# Patient Record
Sex: MALE | Race: WHITE | Employment: OTHER | URBAN - METROPOLITAN AREA
[De-identification: names, ages, dates, MRNs, and addresses within clinical notes are randomized per-mention and may not be internally consistent; named-entity substitution may affect disease eponyms.]

---

## 2018-04-09 ENCOUNTER — APPOINTMENT (OUTPATIENT)
Dept: CT IMAGING | Facility: HOSPITAL | Age: 58
DRG: 186 | End: 2018-04-09
Attending: EMERGENCY MEDICINE
Payer: MEDICARE

## 2018-04-09 ENCOUNTER — APPOINTMENT (OUTPATIENT)
Dept: CV DIAGNOSTICS | Facility: HOSPITAL | Age: 58
DRG: 186 | End: 2018-04-09
Attending: INTERNAL MEDICINE
Payer: MEDICARE

## 2018-04-09 ENCOUNTER — HOSPITAL ENCOUNTER (INPATIENT)
Facility: HOSPITAL | Age: 58
LOS: 5 days | Discharge: HOME HEALTH | DRG: 186 | End: 2018-04-14
Attending: EMERGENCY MEDICINE | Admitting: HOSPITALIST
Payer: MEDICARE

## 2018-04-09 ENCOUNTER — APPOINTMENT (OUTPATIENT)
Dept: GENERAL RADIOLOGY | Facility: HOSPITAL | Age: 58
DRG: 186 | End: 2018-04-09
Attending: EMERGENCY MEDICINE
Payer: MEDICARE

## 2018-04-09 DIAGNOSIS — K92.2 GASTROINTESTINAL HEMORRHAGE, UNSPECIFIED GASTROINTESTINAL HEMORRHAGE TYPE: Primary | ICD-10-CM

## 2018-04-09 DIAGNOSIS — K62.5 RECTAL BLEEDING: ICD-10-CM

## 2018-04-09 DIAGNOSIS — I31.3 PERICARDIAL EFFUSION: ICD-10-CM

## 2018-04-09 DIAGNOSIS — L03.116 CELLULITIS OF LEFT LOWER EXTREMITY: ICD-10-CM

## 2018-04-09 DIAGNOSIS — J90 PLEURAL EFFUSION: ICD-10-CM

## 2018-04-09 PROCEDURE — 99223 1ST HOSP IP/OBS HIGH 75: CPT | Performed by: INTERNAL MEDICINE

## 2018-04-09 PROCEDURE — 71260 CT THORAX DX C+: CPT | Performed by: EMERGENCY MEDICINE

## 2018-04-09 PROCEDURE — 93306 TTE W/DOPPLER COMPLETE: CPT | Performed by: INTERNAL MEDICINE

## 2018-04-09 PROCEDURE — 71045 X-RAY EXAM CHEST 1 VIEW: CPT | Performed by: EMERGENCY MEDICINE

## 2018-04-09 PROCEDURE — 99223 1ST HOSP IP/OBS HIGH 75: CPT | Performed by: HOSPITALIST

## 2018-04-09 RX ORDER — TRAZODONE HYDROCHLORIDE 50 MG/1
50 TABLET ORAL NIGHTLY
COMMUNITY

## 2018-04-09 RX ORDER — TRAZODONE HYDROCHLORIDE 50 MG/1
50 TABLET ORAL NIGHTLY
Status: DISCONTINUED | OUTPATIENT
Start: 2018-04-09 | End: 2018-04-14

## 2018-04-09 RX ORDER — DOCUSATE SODIUM 100 MG/1
100 CAPSULE, LIQUID FILLED ORAL DAILY
Status: DISCONTINUED | OUTPATIENT
Start: 2018-04-09 | End: 2018-04-14

## 2018-04-09 RX ORDER — IPRATROPIUM BROMIDE AND ALBUTEROL SULFATE 2.5; .5 MG/3ML; MG/3ML
3 SOLUTION RESPIRATORY (INHALATION) 3 TIMES DAILY
Status: DISCONTINUED | OUTPATIENT
Start: 2018-04-09 | End: 2018-04-14

## 2018-04-09 RX ORDER — ALPRAZOLAM 1 MG/1
2 TABLET ORAL 3 TIMES DAILY
Status: DISCONTINUED | OUTPATIENT
Start: 2018-04-09 | End: 2018-04-10

## 2018-04-09 RX ORDER — OMEPRAZOLE 20 MG/1
20 CAPSULE, DELAYED RELEASE ORAL
COMMUNITY

## 2018-04-09 RX ORDER — HYDROCODONE BITARTRATE AND ACETAMINOPHEN 5; 325 MG/1; MG/1
1 TABLET ORAL ONCE
Status: DISCONTINUED | OUTPATIENT
Start: 2018-04-09 | End: 2018-04-14

## 2018-04-09 RX ORDER — HYDROCODONE BITARTRATE AND ACETAMINOPHEN 10; 325 MG/1; MG/1
1 TABLET ORAL EVERY 6 HOURS PRN
Status: DISCONTINUED | OUTPATIENT
Start: 2018-04-09 | End: 2018-04-14

## 2018-04-09 RX ORDER — ACETAMINOPHEN 325 MG/1
650 TABLET ORAL EVERY 4 HOURS PRN
Status: DISCONTINUED | OUTPATIENT
Start: 2018-04-09 | End: 2018-04-14

## 2018-04-09 RX ORDER — TIZANIDINE 4 MG/1
4 TABLET ORAL 3 TIMES DAILY
COMMUNITY

## 2018-04-09 RX ORDER — HYDROCODONE BITARTRATE AND ACETAMINOPHEN 5; 325 MG/1; MG/1
2 TABLET ORAL EVERY 4 HOURS PRN
Status: DISCONTINUED | OUTPATIENT
Start: 2018-04-09 | End: 2018-04-14

## 2018-04-09 RX ORDER — PREGABALIN 50 MG/1
50 CAPSULE ORAL 3 TIMES DAILY
COMMUNITY

## 2018-04-09 RX ORDER — AMIODARONE HYDROCHLORIDE 200 MG/1
200 TABLET ORAL 2 TIMES DAILY
Status: DISCONTINUED | OUTPATIENT
Start: 2018-04-09 | End: 2018-04-12

## 2018-04-09 RX ORDER — HYDROCODONE BITARTRATE AND ACETAMINOPHEN 5; 325 MG/1; MG/1
1 TABLET ORAL EVERY 4 HOURS PRN
Status: DISCONTINUED | OUTPATIENT
Start: 2018-04-09 | End: 2018-04-09

## 2018-04-09 RX ORDER — ASPIRIN 81 MG/1
81 TABLET, CHEWABLE ORAL DAILY
COMMUNITY

## 2018-04-09 RX ORDER — PANTOPRAZOLE SODIUM 20 MG/1
20 TABLET, DELAYED RELEASE ORAL
Status: DISCONTINUED | OUTPATIENT
Start: 2018-04-10 | End: 2018-04-14

## 2018-04-09 RX ORDER — METOPROLOL TARTRATE 50 MG/1
50 TABLET, FILM COATED ORAL 2 TIMES DAILY
COMMUNITY

## 2018-04-09 RX ORDER — ALPRAZOLAM 2 MG/1
2 TABLET ORAL 3 TIMES DAILY
Status: ON HOLD | COMMUNITY
End: 2018-04-14

## 2018-04-09 RX ORDER — ATORVASTATIN CALCIUM 40 MG/1
40 TABLET, FILM COATED ORAL NIGHTLY
Status: DISCONTINUED | OUTPATIENT
Start: 2018-04-09 | End: 2018-04-14

## 2018-04-09 RX ORDER — AMIODARONE HYDROCHLORIDE 200 MG/1
200 TABLET ORAL 2 TIMES DAILY
Status: ON HOLD | COMMUNITY
End: 2018-04-14

## 2018-04-09 RX ORDER — POLYETHYLENE GLYCOL 3350 17 G/17G
17 POWDER, FOR SOLUTION ORAL DAILY PRN
Status: DISCONTINUED | OUTPATIENT
Start: 2018-04-09 | End: 2018-04-14

## 2018-04-09 RX ORDER — DEXTROSE MONOHYDRATE 25 G/50ML
50 INJECTION, SOLUTION INTRAVENOUS AS NEEDED
Status: DISCONTINUED | OUTPATIENT
Start: 2018-04-09 | End: 2018-04-14

## 2018-04-09 RX ORDER — HYDROCODONE BITARTRATE AND ACETAMINOPHEN 10; 325 MG/1; MG/1
1 TABLET ORAL EVERY 6 HOURS PRN
COMMUNITY

## 2018-04-09 RX ORDER — SODIUM CHLORIDE 0.9 % (FLUSH) 0.9 %
3 SYRINGE (ML) INJECTION AS NEEDED
Status: DISCONTINUED | OUTPATIENT
Start: 2018-04-09 | End: 2018-04-14

## 2018-04-09 RX ORDER — METOPROLOL TARTRATE 50 MG/1
50 TABLET, FILM COATED ORAL 2 TIMES DAILY
Status: DISCONTINUED | OUTPATIENT
Start: 2018-04-09 | End: 2018-04-14

## 2018-04-09 RX ORDER — NYSTATIN 100000 U/G
CREAM TOPICAL 4 TIMES DAILY
COMMUNITY

## 2018-04-09 RX ORDER — TIZANIDINE 4 MG/1
4 TABLET ORAL 3 TIMES DAILY
Status: DISCONTINUED | OUTPATIENT
Start: 2018-04-09 | End: 2018-04-14

## 2018-04-09 RX ORDER — ALPRAZOLAM 0.5 MG/1
0.5 TABLET ORAL ONCE
Status: DISCONTINUED | OUTPATIENT
Start: 2018-04-09 | End: 2018-04-14

## 2018-04-09 RX ORDER — SODIUM PHOSPHATE, DIBASIC AND SODIUM PHOSPHATE, MONOBASIC 7; 19 G/133ML; G/133ML
1 ENEMA RECTAL ONCE AS NEEDED
Status: DISCONTINUED | OUTPATIENT
Start: 2018-04-09 | End: 2018-04-14

## 2018-04-09 RX ORDER — DOCUSATE SODIUM 100 MG/1
100 CAPSULE, LIQUID FILLED ORAL 2 TIMES DAILY
Status: DISCONTINUED | OUTPATIENT
Start: 2018-04-09 | End: 2018-04-09

## 2018-04-09 RX ORDER — PREGABALIN 50 MG/1
50 CAPSULE ORAL 3 TIMES DAILY
Status: DISCONTINUED | OUTPATIENT
Start: 2018-04-09 | End: 2018-04-14

## 2018-04-09 RX ORDER — ATORVASTATIN CALCIUM 40 MG/1
40 TABLET, FILM COATED ORAL NIGHTLY
COMMUNITY

## 2018-04-09 RX ORDER — ACETAMINOPHEN 325 MG/1
650 TABLET ORAL EVERY 6 HOURS PRN
Status: DISCONTINUED | OUTPATIENT
Start: 2018-04-09 | End: 2018-04-14

## 2018-04-09 RX ORDER — CEPHALEXIN 500 MG/1
500 CAPSULE ORAL 2 TIMES DAILY
Status: ON HOLD | COMMUNITY
End: 2018-04-14

## 2018-04-09 RX ORDER — BISACODYL 10 MG
10 SUPPOSITORY, RECTAL RECTAL
Status: DISCONTINUED | OUTPATIENT
Start: 2018-04-09 | End: 2018-04-14

## 2018-04-09 RX ORDER — DOCUSATE SODIUM 100 MG/1
100 CAPSULE, LIQUID FILLED ORAL DAILY
COMMUNITY

## 2018-04-09 RX ORDER — SODIUM CHLORIDE 9 MG/ML
INJECTION, SOLUTION INTRAVENOUS CONTINUOUS
Status: DISCONTINUED | OUTPATIENT
Start: 2018-04-09 | End: 2018-04-11 | Stop reason: ALTCHOICE

## 2018-04-09 RX ORDER — ASPIRIN 81 MG/1
81 TABLET, CHEWABLE ORAL DAILY
Status: DISCONTINUED | OUTPATIENT
Start: 2018-04-09 | End: 2018-04-14

## 2018-04-09 RX ORDER — FUROSEMIDE 10 MG/ML
20 INJECTION INTRAMUSCULAR; INTRAVENOUS ONCE
Status: COMPLETED | OUTPATIENT
Start: 2018-04-09 | End: 2018-04-09

## 2018-04-09 RX ORDER — MORPHINE SULFATE 4 MG/ML
2 INJECTION, SOLUTION INTRAMUSCULAR; INTRAVENOUS ONCE
Status: COMPLETED | OUTPATIENT
Start: 2018-04-09 | End: 2018-04-09

## 2018-04-09 RX ORDER — ONDANSETRON 2 MG/ML
4 INJECTION INTRAMUSCULAR; INTRAVENOUS EVERY 6 HOURS PRN
Status: DISCONTINUED | OUTPATIENT
Start: 2018-04-09 | End: 2018-04-14

## 2018-04-09 NOTE — CONSULTS
[facility] HOSP - [facility]    Report of Consultation    Carlito Nix.  Patient Status:  Emergency    1960 MRN [id number]   Location [address] Attending Rodrick Rodriguez MD   Hosp Day # 0 PCP Pieter Pena     Date tablet Oral Once   ALPRAZolam (XANAX) tab 0.5 mg 0.5 mg Oral Once       (Not in a hospital admission)    Allergies  No Known Allergies    Review of Systems:    Pertinent items are noted in HPI.     Physical Exam:   Blood pressure 100/62, pulse 83, temperatu Nikc Myers MD on 4/09/2018 at 10:28     Approved by (CST): Vinayak Prabhakar MD on 4/09/2018 at 10:30          Ct Chest Pain/pe (iv Only) Em    Result Date: 4/9/2018  CONCLUSION:  1. No acute pulmonary embolus to the segmental pulmonary artery level.   No he is not agreeable, will discuss again )   2D echocardiogram  Antibiotics for left leg cellulitis per primary service  Follow-up hemoglobin for GI bleed GI was consulted    O2 therapy and nebulizers and bronchial hygiene and incentive spirometry

## 2018-04-09 NOTE — H&P
[facility]    PATIENT'S NAME: Andrea Barajas JR   ATTENDING PHYSICIAN: Carlos Dowling MD   PATIENT ACCOUNT#:   [id number]    LOCATION:  [place] [address]  MEDICAL RECORD #:   [id number]       YOB: 1960  ADMISSION DATE:       04/0 amiodarone and Eliquis. He never had a colonoscopy. He has history of severe anxiety disorder, chronic back pain secondary to degenerative joint disease of lumbar spine, hypertension, diabetes mellitus type 2, hyperlipidemia.     PAST SURGICAL HISTORY:  C extending from the ankle to the knee on the left leg. Surgical sites of vein harvest are well healed. NEUROLOGIC:  Motor and sensory intact. Cranial nerves II-XII are intact. ASSESSMENT:    1.    Left pleural effusion with chest pain with small perica

## 2018-04-09 NOTE — CM/SW NOTE
Per Dr. Gaye Oliver patient is agreeable to transfer to [facility]. Transfer center called.

## 2018-04-09 NOTE — CM/SW NOTE
Jimi Worley @ Dr. Fatimah Treadwell office states [facility] Bed placement is aware of transfer and will call back with bed assignment.

## 2018-04-09 NOTE — PROGRESS NOTES
[facility] Pharmacy Note: Antimicrobial Weight Dose Adjustment for: vancomycin (Lauren Gaitan)    Tremaine Petersen is a 63year old male who has been prescribed vancomycin (VANCOCIN) 1 gm x 1 dose.   CrCl is estimated creatinine clearance is 106.5 mL/min (based on SCr of

## 2018-04-09 NOTE — CM/SW NOTE
Dr. Angelo Larue office paged for possible admitting privileges to a different hospital.     [facility], [facility] given as possible choices.  APN with Dr. Eduardo Mcghee will call back to speak with Dr. Corita Dakins about plan of c

## 2018-04-09 NOTE — CM/SW NOTE
[facility] and Dr. SHRESTHA [facility] office notified that patient will be staying.

## 2018-04-09 NOTE — ED PROVIDER NOTES
Patient Seen in: [facility] AND [facility] Emergency Department    History   Patient presents with:  Dyspnea TRACY SOB (respiratory)    Stated Complaint: sob    HPI    61-year-old male with history of recent CABG, atrial fibrillation on eliquis, DM, HL, previous M headaches. All other systems reviewed and are negative. Positive for stated complaint: sob  Other systems are as noted in HPI. Constitutional and vital signs reviewed. All other systems reviewed and negative except as noted above.     Physical Nursing note and vitals reviewed. ED Course     EKG    Rate, intervals and axes as noted on EKG Report.   Rate: 103  Rhythm: Atrial Fibrillation  Reading: abnormal for rate, abnormal for rhythm      Cardiac Monitor:    Patient placed on the cardiac m Troponin 0.01 <=0.03 ng/mL   -BNP (B TYPE NATRIUERTIC PEPTIDE)   Collection Time: 04/09/18 10:01 AM   Result Value Ref Range   Beta Natriuretic Peptide 379 (H) 0 - 100 pg/mL   -PTT, ACTIVATED   Collection Time: 04/09/18 10:01 AM   Result Value Ref Range 4/09/2018 at 10:30          Ct Chest Pain/pe (iv Only) Em    Result Date: 4/9/2018  CONCLUSION:  1. No acute pulmonary embolus to the segmental pulmonary artery level. No acute aortic injury; no dissection.  2.  Large left pleural effusion occupying approx Anushka Chakraborty [place], [place]   - pt required multiple reevaluations - now is refusing transfer  - pt in pain, requesting morphine  - discussed with Dr. Rosalia Alfred - informed him of pt admission  - discussed with Kashif CAGE for [facility] - informed her of pt consult

## 2018-04-09 NOTE — CONSULTS
[facility] HOSP - [facility]    Report of Consultation    Emeli Anand.  Patient Status:  Inpatient    1960 MRN [id number]   Location [facility] 3W/SW Attending Sandford Duane, MD;Dile*   Hosp Day # 0 PCP SIMONE JAMES     Date of Admis family history. Social History:  Smoking status: Never Smoker                                                              Smokeless tobacco: Never Used                        Allergies/Medications:    Allergies: No Known Allergies    Prescriptions Prior t venous distention   Lungs clear with marked decreased breath sounds at the left base   Cardiovascular exam normal S1-S2 regular rate and rhythm,  no rub heard  no carotid bruit    Abdomen central adiposity soft nontender  Sternal incision site stable    Lo region. There is also fat stranding in the ventral anterior upper abdominal mesentery. These changes are compatible with recent sternotomy/thoracotomy related to CABG. No suspicious organized measurable fluid collection. 6.  Mediastinal lipomatosis.  7.

## 2018-04-09 NOTE — CM/SW NOTE
Patient now requesting to return to [facility] so Dr. Magalie De La Cruz will be able to round on him.

## 2018-04-09 NOTE — CM/SW NOTE
Met with patient and patient's sister Lida Pond (cell: [phone])  about current living arrangements. He is currently living in his one story home alone with multiple friends helping him cook, clean, bathe and care for him.  His sister is in visit

## 2018-04-09 NOTE — ED INITIAL ASSESSMENT (HPI)
Pt arrived via medics to rm 43 for complaint of sob, and left lower leg redness, pain and swelling, 2-3 wks s/p open heart.   State history of anxiety took xanax prior to arrival, states chest pain with inspiration

## 2018-04-09 NOTE — ED NOTES
[address] [facility] [address] to check on patient, [phone]. Told RN Liliane to call back in an hour with regard to admission v. Transfer.

## 2018-04-09 NOTE — ED NOTES
Pt here for today for mid sternal chest pain x 3 days. Pt states pain is worse when taking a deep breath. Pt has redness and swelling to LLE, pt states this was leg used for bypass graft. Leg is hot to touch, redness extends up to above the left knee.  Pt h

## 2018-04-09 NOTE — PROGRESS NOTES
[facility] Pharmacy Note: Antimicrobial Weight Dose Adjustment for: piperacillin/tazobactam ([facility])    Iván Schumacher is a 63year old male who has been prescribed piperacillin/tazobactam (ZOSYN) 3.375 gm x 1 dose.   CrCl is estimated creatinine clearance is 106.5

## 2018-04-10 ENCOUNTER — APPOINTMENT (OUTPATIENT)
Dept: GENERAL RADIOLOGY | Facility: HOSPITAL | Age: 58
DRG: 186 | End: 2018-04-10
Attending: INTERNAL MEDICINE
Payer: MEDICARE

## 2018-04-10 ENCOUNTER — APPOINTMENT (OUTPATIENT)
Dept: ULTRASOUND IMAGING | Facility: HOSPITAL | Age: 58
DRG: 186 | End: 2018-04-10
Attending: INTERNAL MEDICINE
Payer: MEDICARE

## 2018-04-10 PROCEDURE — 99233 SBSQ HOSP IP/OBS HIGH 50: CPT | Performed by: HOSPITALIST

## 2018-04-10 PROCEDURE — 0W9B3ZZ DRAINAGE OF LEFT PLEURAL CAVITY, PERCUTANEOUS APPROACH: ICD-10-PCS | Performed by: INTERNAL MEDICINE

## 2018-04-10 PROCEDURE — 32554 ASPIRATE PLEURA W/O IMAGING: CPT | Performed by: INTERNAL MEDICINE

## 2018-04-10 PROCEDURE — 32555 ASPIRATE PLEURA W/ IMAGING: CPT | Performed by: INTERNAL MEDICINE

## 2018-04-10 PROCEDURE — 71045 X-RAY EXAM CHEST 1 VIEW: CPT | Performed by: INTERNAL MEDICINE

## 2018-04-10 PROCEDURE — 90792 PSYCH DIAG EVAL W/MED SRVCS: CPT | Performed by: OTHER

## 2018-04-10 PROCEDURE — 99223 1ST HOSP IP/OBS HIGH 75: CPT | Performed by: INTERNAL MEDICINE

## 2018-04-10 PROCEDURE — 99233 SBSQ HOSP IP/OBS HIGH 50: CPT | Performed by: INTERNAL MEDICINE

## 2018-04-10 RX ORDER — ALPRAZOLAM 1 MG/1
1 TABLET ORAL 3 TIMES DAILY
Status: DISCONTINUED | OUTPATIENT
Start: 2018-04-10 | End: 2018-04-14

## 2018-04-10 RX ORDER — 0.9 % SODIUM CHLORIDE 0.9 %
VIAL (ML) INJECTION
Status: COMPLETED
Start: 2018-04-10 | End: 2018-04-10

## 2018-04-10 RX ORDER — HALOPERIDOL 5 MG/ML
1 INJECTION INTRAMUSCULAR EVERY 6 HOURS PRN
Status: DISCONTINUED | OUTPATIENT
Start: 2018-04-10 | End: 2018-04-14

## 2018-04-10 RX ORDER — ESCITALOPRAM OXALATE 10 MG/1
5 TABLET ORAL NIGHTLY
Status: DISCONTINUED | OUTPATIENT
Start: 2018-04-10 | End: 2018-04-14

## 2018-04-10 RX ORDER — SODIUM CHLORIDE 9 MG/ML
INJECTION, SOLUTION INTRAVENOUS
Status: COMPLETED
Start: 2018-04-10 | End: 2018-04-10

## 2018-04-10 NOTE — PROCEDURES
Thoracentesis Procedure Note    Pre-operative Diagnosis: large left sided pleural effusion     Post-operative Diagnosis: same    Indications: very large left sided pleural effusion     Procedure Details   Consent: Informed consent was obtained.  Risks of th

## 2018-04-10 NOTE — BH PROGRESS NOTE
This writer met with the patient at bedside today to complete the PHQ9 depression screening. The patient was receptive and coorperative, but very sleepy during this visit and his responses were unclear.   This writer will follow-up at a later time when the

## 2018-04-10 NOTE — PROGRESS NOTES
Cardiac Surgery Misc. Note    Patient seen today at the request of Dr. Taylor De.  The patient is s/p CABG with left lower extremity EVH at [facility] two weeks ago and presented to [facility] due to increasing shortness of breath and bilateral l

## 2018-04-10 NOTE — PLAN OF CARE
Anxiety Related to PTSD    • Long Term Goal - get better  Progressing    • Patient Stated Goal - don't want to die  Progressing    • Patient Goal-  better Progressing        Anxiety, Panic, OCD    • Long Term Goal - h/o anxiety disorder  Progressing    • P

## 2018-04-10 NOTE — PLAN OF CARE
Patient was  drowsy at night from 1900  To 0100 am. He was unable to open his  eye  Or ready to take his night medications by mouth. After 0100 am he came back to normal. Given his night medications.  But did not give his antianxiety medication with his reg

## 2018-04-10 NOTE — PROGRESS NOTES
GI    I was asked to evaluate the patient for an episode of rectal bleeding earlier today in the setting of recent bypass surgery, systemic anticoagulation with Eliquis and a pleural effusion.     The patient was sleeping and was aroused, however, stated \"

## 2018-04-10 NOTE — PLAN OF CARE
Anxiety Related to PTSD    • Long Term Goal Progressing    • Patient Stated Goal Progressing    • Patient Goal Progressing        Anxiety, Panic, OCD    • Long Term Goal Progressing    • Patient Stated Goal Progressing    • Patient Goal Progressing

## 2018-04-10 NOTE — PROGRESS NOTES
[facility]    Progress Note    Marilee Samson.  Patient Status:  Inpatient    1960 MRN [id number]   Location [facility] 3W/SW Attending Livan Nicole MD;Dile*   Hosp Day # 1 PCP SIMONE JAMES     Subjective:     Cons Lab Results  Component Value Date   WBC 7.0 04/10/2018   HGB 11.3 (L) 04/10/2018   HCT 34.1 (L) 04/10/2018    04/10/2018   CREATSERUM 0.73 04/10/2018   BUN 10 04/10/2018    04/10/2018   K 4.0 04/10/2018    04/10/2018   CO2 27 04/10/2 4/09/2018 at 11:45     Approved by (CST): Lio Stringer MD on 4/09/2018 at 11:51          Ekg 12-lead    Result Date: 4/9/2018  ECG Report  Interpretation  -------------------------- Atrial fibrillation Diffuse low voltage.  -Nonspecific QRS widening.  -Poo

## 2018-04-10 NOTE — PROGRESS NOTES
[facility] dosing service    Initial Pharmacokinetic Consult for Vancomycin Dosing     Erica Pedraza. is a 63year old male admitted on 4/9/18 who is being treated for cellulitis. Pharmacy has been asked to dose Vancomycin by Dr. Jeanne Cedillo.     He jane

## 2018-04-10 NOTE — CONSULTS
Gastroenterology consultation note    Reason for consultation:  Rectal bleeding, Hemoccult positive stool      History of present illness:   The patient is a 63year old male who I initially attempted to interview yesterday evening, however, the patient was patient is otherwise well. He is despondent that his mother passed away and that he is alone. He denies nausea or vomiting. No abdominal pain. Prior history of occasional constipation. No family history of colorectal cancer.     Past Medical Histor aspirin chewable tab 81 mg 81 mg Oral Daily   atorvastatin (LIPITOR) tab 40 mg 40 mg Oral Nightly   docusate sodium (COLACE) cap 100 mg 100 mg Oral Daily   HYDROcodone-acetaminophen (NORCO)  MG per tab 1 tablet 1 tablet Oral Q6H PRN   MetFORMIN HCl Release Take 20 mg by mouth every morning before breakfast. Disp:  Rfl:          Social History  Social History   Marital status: Single  Spouse name: N/A    Years of education: N/A  Number of children: N/A     Occupational History  None on file     Social Value Date   INR 1.4 (H) 04/09/2018         Xr Chest Ap Portable  (cpt=71045)    Result Date: 4/9/2018  CONCLUSION:  1. Cardiomegaly, central vascular congestion and evidence of prior cardiac surgery.  2. Large airspace opacity in the left mid and lower odette has been held in anticipation of a thoracentesis today and based on the fact that the patient is in sinus rhythm. I suspect that the patient's bleeding is related to either a hemorrhoid, anal fissure or stercoral ulceration related to constipation.   We ha

## 2018-04-10 NOTE — PROGRESS NOTES
[facility]    Progress Note    Sarah Vinson.  Patient Status:  Inpatient    1960 MRN [id number]   Location [facility] 3W/SW Attending Brenda Jerome MD;Dile*   Hosp Day # 1 PCP SIMONE JAMES       SUBJECTIVE:  Had a is not excluded. Dictated by (CST): Angelika Cedillo MD on 4/09/2018 at 10:28     Approved by (CST): Angelika Cedillo MD on 4/09/2018 at 10:30          Ct Chest Pain/pe (iv Only) Em    Result Date: 4/9/2018  CONCLUSION:  1.  No acute pulmonary embo (NOVOLOG) 100 UNIT/ML flexpen 1-11 Units 1-11 Units Subcutaneous TID CC   Normal Saline Flush 0.9 % injection 3 mL 3 mL Intravenous PRN   acetaminophen (TYLENOL) tab 650 mg 650 mg Oral Q6H PRN   ondansetron HCl (ZOFRAN) injection 4 mg 4 mg Intravenous Q6H lasix  - monitor renal function with diuresis  - monitor I/O and daily weights    HTN  - cnt current meds and monitor vitals, adjust as needed    HL  - statin    Morbid Obesity  - BMI 43    Post op Afib  - rates contrlled, cont amio  - resume eliquis once

## 2018-04-10 NOTE — PROGRESS NOTES
[facility]    Cardiology Progress Note  [facility]    Matilde Grover.  Patient Status:  Inpatient    1960 MRN [id number]   Location [facility] 3W/SW Attending Ky Warner MD;[place]ile*   Hosp Day # 1 In nsr, keep off eliquis, on amio    Edema, post op tho more so from infection.  No new recs        Results:     Lab Results  Component Value Date   WBC 7.0 04/10/2018   HGB 11.3 (L) 04/10/2018   HCT 34.1 (L) 04/10/2018    04/10/2018   CREATSERUM 0.7 are also present in this region. There is also fat stranding in the ventral anterior upper abdominal mesentery. These changes are compatible with recent sternotomy/thoracotomy related to CABG. No suspicious organized measurable fluid collection.  6.  Med

## 2018-04-10 NOTE — CONSULTS
[facility] ID CONSULT NOTE    Slime Dorman.  Patient Status:  Inpatient    1960 MRN [id number]   Location [facility] 3W/SW Attending Raquel Alexander MD;Dile*   Hosp Day # 1 PCP [address]       Reason family history. reports that he has never smoked.  He has never used smokeless tobacco.    Allergies:  No Known Allergies    Medications:    Current Facility-Administered Medications:   •  polyethylene glycol (GOLYTELY) solution 4,000 mL, 4,000 mL, Oral, ipratropium-albuterol (DUONEB) nebulizer solution 3 mL, 3 mL, Nebulization, TID  •  amiodarone HCl (PACERONE) tab 200 mg, 200 mg, Oral, BID  •  aspirin chewable tab 81 mg, 81 mg, Oral, Daily  •  atorvastatin (LIPITOR) tab 40 mg, 40 mg, Oral, Nightly  •  do --    CO2  35  34   --        Microbiology: Reviewed in EMR    Radiology: Reviewed    ASSESSMENT:    Antibiotics: Vancomycin, Zosyn (4/9-, day 2    Dez Lora. is a 63year old male with past medical history significant for atrial fibrillation on e Follow fever curve, wbc. -  Keep LLE elevated. -  FU thoracentesis results. -  Reviewed labs, micro, imaging reports, available old records. -  Case d/w patient, RN, Dr. Erin Haddad. Thank you for allowing us to participate in the care of this patient.

## 2018-04-11 ENCOUNTER — SURGERY (OUTPATIENT)
Age: 58
End: 2018-04-11

## 2018-04-11 ENCOUNTER — ANESTHESIA (OUTPATIENT)
Dept: ENDOSCOPY | Facility: HOSPITAL | Age: 58
DRG: 186 | End: 2018-04-11
Payer: MEDICARE

## 2018-04-11 ENCOUNTER — ANESTHESIA EVENT (OUTPATIENT)
Dept: ENDOSCOPY | Facility: HOSPITAL | Age: 58
DRG: 186 | End: 2018-04-11
Payer: MEDICARE

## 2018-04-11 ENCOUNTER — TELEPHONE (OUTPATIENT)
Dept: GASTROENTEROLOGY | Facility: CLINIC | Age: 58
End: 2018-04-11

## 2018-04-11 PROCEDURE — 99232 SBSQ HOSP IP/OBS MODERATE 35: CPT | Performed by: INTERNAL MEDICINE

## 2018-04-11 PROCEDURE — 99232 SBSQ HOSP IP/OBS MODERATE 35: CPT | Performed by: OTHER

## 2018-04-11 PROCEDURE — 0DBN8ZZ EXCISION OF SIGMOID COLON, VIA NATURAL OR ARTIFICIAL OPENING ENDOSCOPIC: ICD-10-PCS | Performed by: INTERNAL MEDICINE

## 2018-04-11 PROCEDURE — 99233 SBSQ HOSP IP/OBS HIGH 50: CPT | Performed by: HOSPITALIST

## 2018-04-11 RX ORDER — NALOXONE HYDROCHLORIDE 0.4 MG/ML
80 INJECTION, SOLUTION INTRAMUSCULAR; INTRAVENOUS; SUBCUTANEOUS AS NEEDED
Status: DISCONTINUED | OUTPATIENT
Start: 2018-04-11 | End: 2018-04-11 | Stop reason: HOSPADM

## 2018-04-11 RX ORDER — SODIUM CHLORIDE, SODIUM LACTATE, POTASSIUM CHLORIDE, CALCIUM CHLORIDE 600; 310; 30; 20 MG/100ML; MG/100ML; MG/100ML; MG/100ML
INJECTION, SOLUTION INTRAVENOUS CONTINUOUS PRN
Status: DISCONTINUED | OUTPATIENT
Start: 2018-04-11 | End: 2018-04-11 | Stop reason: SURG

## 2018-04-11 RX ORDER — SODIUM CHLORIDE, SODIUM LACTATE, POTASSIUM CHLORIDE, CALCIUM CHLORIDE 600; 310; 30; 20 MG/100ML; MG/100ML; MG/100ML; MG/100ML
INJECTION, SOLUTION INTRAVENOUS CONTINUOUS
Status: DISCONTINUED | OUTPATIENT
Start: 2018-04-11 | End: 2018-04-11 | Stop reason: ALTCHOICE

## 2018-04-11 RX ORDER — LIDOCAINE HYDROCHLORIDE 10 MG/ML
INJECTION, SOLUTION EPIDURAL; INFILTRATION; INTRACAUDAL; PERINEURAL AS NEEDED
Status: DISCONTINUED | OUTPATIENT
Start: 2018-04-11 | End: 2018-04-11 | Stop reason: SURG

## 2018-04-11 RX ORDER — 0.9 % SODIUM CHLORIDE 0.9 %
VIAL (ML) INJECTION
Status: COMPLETED
Start: 2018-04-11 | End: 2018-04-11

## 2018-04-11 RX ORDER — CLOPIDOGREL BISULFATE 75 MG/1
75 TABLET ORAL DAILY
Status: DISCONTINUED | OUTPATIENT
Start: 2018-04-12 | End: 2018-04-14

## 2018-04-11 RX ADMIN — LIDOCAINE HYDROCHLORIDE 50 MG: 10 INJECTION, SOLUTION EPIDURAL; INFILTRATION; INTRACAUDAL; PERINEURAL at 12:13:00

## 2018-04-11 RX ADMIN — SODIUM CHLORIDE, SODIUM LACTATE, POTASSIUM CHLORIDE, CALCIUM CHLORIDE: 600; 310; 30; 20 INJECTION, SOLUTION INTRAVENOUS at 12:13:00

## 2018-04-11 NOTE — CONSULTS
[facility] HOSP - [facility]    Report of Consultation    Bertha Hernandez.  Patient Status:  Inpatient    1960 MRN [id number]   Location [facility] 3W/SW Attending Christie Hernandes MD;Dile*   Hosp Day # 1 PCP SIMONE JAMES     Date of Adm coronary artery    • Atrial fibrillation (HCC)    • Back pain    • Diabetes ([address]    • Heart attack ([address]    • Hyperlipidemia        Past Surgical History  Past Surgical History:  No date: OPEN HEART SURGICAL PROFILE    Family History  History reviewed.  No PRN   FLEET ENEMA (FLEET) 7-19 GM/118ML enema 133 mL 1 enema Rectal Once PRN   ipratropium-albuterol (DUONEB) nebulizer solution 3 mL 3 mL Nebulization TID   amiodarone HCl (PACERONE) tab 200 mg 200 mg Oral BID   aspirin chewable tab 81 mg 81 mg Oral Daily Allergies      Review of Systems:     As by Admitting/Attending    Results:     Laboratory Data:    Lab Results  Component Value Date   WBC 7.0 04/10/2018   HGB 11.3 (L) 04/10/2018   HCT 34.1 (L) 04/10/2018    04/10/2018   CREATSERUM 0.73 04/10/2018 pleural effusion occupying approximately 2/3 of left hemithorax volume with secondary compressive atelectasis of portions of the left upper lobe as well as the majority of the left lower lobe. 3.  Trace right basal pleural effusion.   Scattered atelectasis Reevaluate patient tomorrow.     Orders This Visit:    Orders Placed This Encounter      CBC With Differential With Platelet      Basic Metabolic Panel (8)      Troponin I      BNP (Brain Natriuretic Peptide)      PTT, Activated      Prothrombin Time (PT)

## 2018-04-11 NOTE — BH PROGRESS NOTE
Behavioral Health Note  CHIEF COMPLAINT  Depression and anxiety  REASON FOR ADMISSION  Left pleural effusion, chest pain, left leg cellulitis, and brief episode of gastrointestinal bleed    SOCIAL HISTORY  The patient is currently living in his late [facility] reporting that he is having PTSD as a result of his experience.   Patient however reports that difficulty with pain management caused him to have angry outbursts and admits to throwing things at the walls  d/t not being able to control his anger or labile  Conscious/Orientation: alert and oriented x3, poor attention, no memory impairments  Thought process: disorganized, distracted, unable to focus , fast flow, not logical or relevant at times  Thought content:   preoccupied with medical issues/stress

## 2018-04-11 NOTE — PROGRESS NOTES
[facility] dosing service    Follow-up Pharmacokinetic Consult for Vancomycin Dosing     Wayne Hernandez is a 63year old male admitted on 4/9/18 who is being treated for cellulitis of left leg.    Patient is on day 3 of Vancomycin 1.5 gm IV Q 12 hour approximately 3 days. Goal trough level 10-15 ug/mL. 3.  Pharmacy will need BUN/Scr daily while on Vancomycin to assess renal function. 4.  Pharmacy will follow and monitor renal function changes, toxicity and efficacy.     Maribell Frederick, PharmD  8/

## 2018-04-11 NOTE — OPERATIVE REPORT
[facility] Endoscopy Report      Date of Procedure:  04/11/18      Preoperative Diagnosis:  1. Rectal bleeding and Hemoccult-positive stool  2. Episodic constipation  3. Colorectal cancer screening      Postoperative Diagnosis:  1.   Smal were #3 5–6 mm sessile polyps in the proximal sigmoid/distal descending colon which were cold snare excised and retrieved via suction. No ongoing bleeding. There were a few scattered diverticula seen in the sigmoid colon without signs of complication.   Mere Brownlee

## 2018-04-11 NOTE — TELEPHONE ENCOUNTER
Dr Celso Zimmerman,    Dr Anita Patel nurse Riccardo Adams calling ([phone])    She would like to know if it's ok from a GI standpoint if it's ok if the pt goes back on Plavix and ASA after his Colonoscopy today.  Pt is refusing the Eliquis    Please advise

## 2018-04-11 NOTE — PROGRESS NOTES
[facility] ID PROGRESS NOTE    Elmer Opitz Illona Cane.  Patient Status:  Inpatient    1960 MRN [id number]   Location [facility] ENDOSCOPY LAB SUITES Attending Umair Sequeira MD;[facility]*   Hosp Day # 2 PCP SIMONE JAMES     Subjective:  Awake, going for limited due to patient being sedated by anxiety medications when seen so most of the history was obtained from the chart.  Patient was having bloody bowel movements at home and was having increasing pain in LLE and SOB so home health nurse advised patient t

## 2018-04-11 NOTE — PROGRESS NOTES
[facility] Heart Cardiology Progress Note      Elbridge Dec.  Patient Status:  Inpatient    1960 MRN [id number]   Location [facility] 3W/SW Attending Tonja Cameron MD;[facility]*   Hosp Day # 2 PCP Mitra Carney (3.6)     IV PIGGYBACK 100      Total Intake(mL/kg) 660 (4.5) 4220 (29.4)     Urine (mL/kg/hr) 600 675 (0.2)     Stool 0 0 (0)     Total Output 600 675      Net +60 +3545             Void Urine Occurrence 0 x 2 x     Stool Occurrence 0 x 4 x          Wt Re 104  105   --   104   CO2  27  27   --   26      Recent Labs   Lab  04/09/18   1001  04/10/18   0658  04/11/18   0626   RBC  3.95*  3.64*  3.70*   HGB  12.2*  11.3*  11.4*   HCT  36.5*  34.1*  34.6*   MCV  92.5  93.8  93.5   MCH  30.8  31.0  30.9   MCHC  3 also fat stranding in the ventral anterior upper abdominal mesentery. These changes are compatible with recent sternotomy/thoracotomy related to CABG. No suspicious organized measurable fluid collection. 6.  Mediastinal lipomatosis.  7.  Pulmonary artery

## 2018-04-11 NOTE — PROGRESS NOTES
[facility]    Progress Note    Nicky Galarza.  Patient Status:  Inpatient    1960 MRN [id number]   Location [facility] 3W/SW Attending Latonia Harrell MD;[place]*   Hosp Day # 2 PCP SIMONE JAMES       SUBJECTIVE:    No pulmonary congestive changes. Significant decreased left-sided effusion status post thoracentesis. 3. No pneumothorax.  Small residual left-sided effusion remains     Dictated by (CST): Davin Ayala MD on 4/10/2018 at 16:03     Approved by (CST): Ben Facility-Administered Medications:  lactated ringers infusion  Intravenous Continuous   Insulin Aspart Pen (NOVOLOG) 100 UNIT/ML flexpen 1-11 Units 1-11 Units Subcutaneous TID CC   insulin detemir (LEVEMIR) 100 UNIT/ML flextouch 12 Units 12 Units Subcutane Pantoprazole Sodium (PROTONIX) EC tab 20 mg 20 mg Oral QAM AC   pregabalin (LYRICA) cap 50 mg 50 mg Oral TID   TiZANidine HCl (ZANAFLEX) tab 4 mg 4 mg Oral TID   TraZODone HCl (DESYREL) tab 50 mg 50 mg Oral Nightly         Assessment  Patient Active Prob

## 2018-04-11 NOTE — ANESTHESIA PREPROCEDURE EVALUATION
Anesthesia PreOp Note    HPI:     Tin Rivera. is a 63year old male who presents for preoperative consultation requested by: Aly Rodriguez MD    Date of Surgery: 4/9/2018 - 4/11/2018    Procedure(s):  COLONOSCOPY  Indication: rectal bleedin 2 (two) times daily with meals. Disp:  Rfl:  Taking   amiodarone HCl 200 MG Oral Tab Take 200 mg by mouth 2 (two) times daily. Disp:  Rfl:  Taking   apixaban (ELIQUIS) 5 MG Oral Tab Take 5 mg by mouth 2 (two) times daily.  Disp:  Rfl:  Taking   atorvastatin injection 3 mL 3 mL Intravenous PRN Igor Pollack MD 3 mL at 04/10/18 1619    acetaminophen (TYLENOL) tab 650 mg 650 mg Oral Q6H PRN Igor Pollack MD     ondansetron HCl (ZOFRAN) injection 4 mg 4 mg Intravenous Q6H PRN Igor Pollack MD     acetami Oral Nightly Stephany Saenz MD 50 mg at 04/10/18 2114      No current [facility]-ordered outpatient prescriptions on file. No Known Allergies    History reviewed. No pertinent family history.     Social History  Social History   Marital status: Single  Spous exam     Pulmonary - negative ROS and normal exam    breath sounds clear to auscultation  Cardiovascular - negative ROS and normal exam  Exercise tolerance: good  (+) CAD,     Rhythm: regular  Rate: normal    Neuro/Psych - negative ROS     GI/Hepatic/Renal

## 2018-04-11 NOTE — INTERVAL H&P NOTE
Pre-op Diagnosis: rectal bleeding,hemacult positive stool        The above referenced H&P was reviewed by Carly Florian MD on 4/11/2018, the patient was examined and no significant changes have occurred in the patient's condition since the H&P was p

## 2018-04-12 ENCOUNTER — APPOINTMENT (OUTPATIENT)
Dept: GENERAL RADIOLOGY | Facility: HOSPITAL | Age: 58
DRG: 186 | End: 2018-04-12
Attending: INTERNAL MEDICINE
Payer: MEDICARE

## 2018-04-12 PROCEDURE — 99232 SBSQ HOSP IP/OBS MODERATE 35: CPT | Performed by: INTERNAL MEDICINE

## 2018-04-12 PROCEDURE — 99233 SBSQ HOSP IP/OBS HIGH 50: CPT | Performed by: HOSPITALIST

## 2018-04-12 PROCEDURE — 99231 SBSQ HOSP IP/OBS SF/LOW 25: CPT | Performed by: INTERNAL MEDICINE

## 2018-04-12 PROCEDURE — 71046 X-RAY EXAM CHEST 2 VIEWS: CPT | Performed by: INTERNAL MEDICINE

## 2018-04-12 RX ORDER — AMIODARONE HYDROCHLORIDE 200 MG/1
200 TABLET ORAL DAILY
Status: DISCONTINUED | OUTPATIENT
Start: 2018-04-13 | End: 2018-04-14

## 2018-04-12 RX ORDER — 0.9 % SODIUM CHLORIDE 0.9 %
VIAL (ML) INJECTION
Status: COMPLETED
Start: 2018-04-12 | End: 2018-04-12

## 2018-04-12 RX ORDER — FUROSEMIDE 10 MG/ML
20 INJECTION INTRAMUSCULAR; INTRAVENOUS DAILY
Status: DISCONTINUED | OUTPATIENT
Start: 2018-04-12 | End: 2018-04-13

## 2018-04-12 NOTE — PROGRESS NOTES
[facility]     Progress Note        Cathy Plaza.  Patient Status:  Inpatient    1960 MRN [id number]   Location [facility] 3W/SW Attending Susie Boswell MD;Nga*   Hosp Day # 3 PCP SIMONE JAMES       Subjective:   P mg Oral Q4H PRN   Or      HYDROcodone-acetaminophen (NORCO) 5-325 MG per tab 2 tablet 2 tablet Oral Q4H PRN   PEG 3350 (MIRALAX) powder packet 17 g 17 g Oral Daily PRN   magnesium hydroxide (MILK OF MAGNESIA) 400 MG/5ML suspension 30 mL 30 mL Oral Daily MO Kevin Wisdom MD on 4/10/2018 at 14:08          Xr Chest Ap Portable  (cpt=71045)    Result Date: 4/10/2018  CONCLUSION:  1. Mild/moderate cardiomegaly with pulmonary vascular distention. 2. Mild pulmonary congestive changes.  Significant decreased le

## 2018-04-12 NOTE — PROGRESS NOTES
[facility]    Cardiology Progress Note    Pam Guaman.  Patient Status:  Inpatient    1960 MRN [id number]   Location [facility] 3W/SW Attending Susie Boswell MD;Nga*   Hosp Day # 3 [facility] [address] Aspart Pen  1-11 Units Subcutaneous TID CC   • Clopidogrel Bisulfate  75 mg Oral Daily   • insulin detemir  12 Units Subcutaneous Nightly   • alprazolam  1 mg Oral TID   • escitalopram  5 mg Oral Nightly   • HYDROcodone-acetaminophen  1 tablet Oral Once Significant decreased left-sided effusion status post thoracentesis. 3. No pneumothorax.  Small residual left-sided effusion remains     Dictated by (CST): Dread Gray MD on 4/10/2018 at 16:03     Approved by (CST): Dread Gray MD on 4/10/20

## 2018-04-12 NOTE — PROGRESS NOTES
[facility] ID PROGRESS NOTE    Elmer Opitz Illona Cane.  Patient Status:  Inpatient    1960 MRN [id number]   Location [facility] ENDOSCOPY LAB SUITES Attending Umair Sequeira MD;[facility]*   Hosp Day # 3 PCP SIMONE JAMES     Subjective:  Awake, states le from home on 4/9 with LLE erythema and worsening SOB. History is limited due to patient being sedated by anxiety medications when seen so most of the history was obtained from the chart.  Patient was having bloody bowel movements at home and was having incr PA-C  [facility]  [phone]  4/11/2018

## 2018-04-12 NOTE — PROGRESS NOTES
Patient evaluated today for over 35 minute for follow-up. Over 50% counseling and managing treatment plan. The patient today exhibited some labile affect and slight irritability.   Patient started reporting that he knows work ethics and he is very happy disorder. Rule out mood disorder NOS. 1.  Focus on education and support  2. Therapy focus on insight orientation and help with the patient understand the treatment plan. 3.  Continue Xanax 1 mg 3 times daily. 4.  Continue Lexapro 5 mg nightly  5.

## 2018-04-12 NOTE — PROGRESS NOTES
[address]     Gastroenterology Progress Note    Amy Shell.  Patient Status:  Inpatient    1960 MRN [id number]   Location [facility] 3W/SW Attending Marga Cervantes MD;Nga*   Hosp Day # 3 PCP Dora Rubinstein normal      Results:     Recent Labs   Lab  04/10/18   0658  04/11/18   0626  04/12/18   0720   RBC  3.64*  3.70*  3.76*   HGB  11.3*  11.4*  11.6*   HCT  34.1*  34.6*  34.8*   MCV  93.8  93.5  92.6   MCH  31.0  30.9  30.9   MCHC  33.0  33.1  33.4   RDW  1

## 2018-04-12 NOTE — PROGRESS NOTES
[facility]    Progress Note    Fern Rizzo Saulo Razo.  Patient Status:  Inpatient    1960 MRN [id number]   Location [facility] 3W/SW Attending Tonja Cameron MD;Dile*   Hosp Day # 3 PCP SIMONE JAMES       SUBJECTIVE:    No 4/10/2018  CONCLUSION: Ultrasound guided left thoracentesis performed by Dr. Nedra Escoto.      Dictated by (CST): Vinayak Prabhakar MD on 4/10/2018 at 14:06     Approved by (CST): Vinayak Prabhakar MD on 4/10/2018 at 14:08          Xr Chest Ap Portable  (cpt HYDROcodone-acetaminophen (NORCO) 5-325 MG per tab 2 tablet 2 tablet Oral Q4H PRN   PEG 3350 (MIRALAX) powder packet 17 g 17 g Oral Daily PRN   magnesium hydroxide (MILK OF MAGNESIA) 400 MG/5ML suspension 30 mL 30 mL Oral Daily PRN   bisacodyl (DULCOLAX) accPRECIOUS vela, levemir, hold metformin    LLE cellulitis on leg with vein harvest  - cont vanco IV  - ID eval greta    CAD  - s/p recent CABG at [facility] (about 2 weeks ago)  - cards following, monitor on tele\    Anxiety  -greta psych eval  -on large does o

## 2018-04-12 NOTE — CM/SW NOTE
4/12/18 CM Discharge planning   Rec'd a call from [facility], pt is current with home RN and PT.  [phone]. WIll need resume HHC order at d/c . CM will continue to follow.   Meng Irvin  [phone]

## 2018-04-13 ENCOUNTER — TELEPHONE (OUTPATIENT)
Dept: GASTROENTEROLOGY | Facility: CLINIC | Age: 58
End: 2018-04-13

## 2018-04-13 ENCOUNTER — MED REC SCAN ONLY (OUTPATIENT)
Dept: GASTROENTEROLOGY | Facility: CLINIC | Age: 58
End: 2018-04-13

## 2018-04-13 PROCEDURE — 99233 SBSQ HOSP IP/OBS HIGH 50: CPT | Performed by: HOSPITALIST

## 2018-04-13 PROCEDURE — 99232 SBSQ HOSP IP/OBS MODERATE 35: CPT | Performed by: INTERNAL MEDICINE

## 2018-04-13 PROCEDURE — 99233 SBSQ HOSP IP/OBS HIGH 50: CPT | Performed by: OTHER

## 2018-04-13 RX ORDER — MAGNESIUM OXIDE 400 MG (241.3 MG MAGNESIUM) TABLET
400 TABLET ONCE
Status: COMPLETED | OUTPATIENT
Start: 2018-04-13 | End: 2018-04-13

## 2018-04-13 RX ORDER — FUROSEMIDE 10 MG/ML
40 INJECTION INTRAMUSCULAR; INTRAVENOUS
Status: DISCONTINUED | OUTPATIENT
Start: 2018-04-13 | End: 2018-04-14

## 2018-04-13 RX ORDER — POTASSIUM CHLORIDE 20 MEQ/1
40 TABLET, EXTENDED RELEASE ORAL EVERY 4 HOURS
Status: COMPLETED | OUTPATIENT
Start: 2018-04-13 | End: 2018-04-13

## 2018-04-13 RX ORDER — ARIPIPRAZOLE 2 MG/1
2 TABLET ORAL NIGHTLY
Status: DISCONTINUED | OUTPATIENT
Start: 2018-04-13 | End: 2018-04-14

## 2018-04-13 NOTE — TELEPHONE ENCOUNTER
I updated health maintenance  Recall colon in 3 years per. Colon done 4/11/18  GI RN staff: Please enter colonoscopy recall for 3 years.

## 2018-04-13 NOTE — PROGRESS NOTES
[facility]    Progress Note    Freddie Worthy.  Patient Status:  Inpatient    1960 MRN [id number]   Location [facility] 3W/SW Attending Chanell Nino MD;[facility]*   Hosp Day # 4 PCP SIMONE JAMES         Assessment and Kavita Net              495 ml      This shift: No intake/output data recorded.      Exam  Gen: No acute distress, alert and oriented x3,   Neck:supple,no JVD  Pulm: Lungs clear, normal respiratory effort  CV: Heart with regular rate and rhythm, Nl S1,S2 ,no S3 edema with worsening left pleural effusion and associated left basilar airspace disease.   Dictated by (CST): Rony Álvarez MD on 4/12/2018 at 12:59     Approved by (CST): Rony Álvarez MD on 4/12/2018 at 13:02                  March MD Malcom

## 2018-04-13 NOTE — PLAN OF CARE
Problem: Diabetes/Glucose Control  Goal: Glucose maintained within prescribed range  INTERVENTIONS:  - Monitor Blood Glucose as ordered  - Assess for signs and symptoms of hyperglycemia and hypoglycemia  - Administer ordered medications to maintain glucose MD's           Outcome: Progressing    Goal: Patient Goal  Goal Description: Control of anxiety    Objectives: To be able to stabilize patient's anxiety    Interventions:     Medications as ordered. Monitor effectiveness of treatment plan.   Relaxation jennie

## 2018-04-13 NOTE — PROGRESS NOTES
[facility]    Progress Note    Erick Zavala.  Patient Status:  Inpatient    1960 MRN [id number]   Location [facility] 3W/SW Attending Diana Palacio MD;[place]*   Hosp Day # 4 PCP SIMONE JAMES       SUBJECTIVE:    No 4/10/2018  CONCLUSION: Ultrasound guided left thoracentesis performed by Dr. Joselin Fitzgerald.      Dictated by (CST): Shelia Osborn MD on 4/10/2018 at 14:06     Approved by (CST): Shelia Osborn MD on 4/10/2018 at 14:08          Xr Chest Ap Portable  (cpt PRN   acetaminophen (TYLENOL) tab 650 mg 650 mg Oral Q6H PRN   ondansetron HCl (ZOFRAN) injection 4 mg 4 mg Intravenous Q6H PRN   acetaminophen (TYLENOL) tab 650 mg 650 mg Oral Q4H PRN   Or      HYDROcodone-acetaminophen (NORCO) 5-325 MG per tab 2 tablet 2 thoracentesis 4/10  - pulm following  - f/u cxr 1-2 weeks and pulm op f/u    MIGUEL  - cpap protocol    DM  - cont accuchecks, ISS, levemir, hold metformin    LLE cellulitis on leg with vein harvest  - cont vanco IV  - ID eval greta    CAD  - s/p recent CABG at

## 2018-04-13 NOTE — TELEPHONE ENCOUNTER
----- Message from Victorina Jama MD sent at 4/12/2018  8:51 PM CDT -----  Please see inpatient notes. I informed the patient of the findings of the tubular adenomas and their significance. I also discussed diverticulosis and a high-fiber diet.   I

## 2018-04-13 NOTE — PROGRESS NOTES
[facility] ID PROGRESS NOTE    Nicholette Comas [facility] Estuardo.  Patient Status:  Inpatient    1960 MRN [id number]   Location [facility] ENDOSCOPY LAB SUITES Attending Thompson Keller MD;[facility]*   Hosp Day # 4 PCP SIMONE JAMES     Subjective:  Awake, states le from home on 4/9 with LLE erythema and worsening SOB. History is limited due to patient being sedated by anxiety medications when seen so most of the history was obtained from the chart.  Patient was having bloody bowel movements at home and was having incr compression to help with swelling.  -  Reviewed labs, micro, imaging reports.  -  Case d/w patient, RN.     Juan Laurent PA-C  [facility]  [phone]  4/11/2018

## 2018-04-13 NOTE — PLAN OF CARE
GASTROINTESTINAL - ADULT    • Maintains or returns to baseline bowel function Progressing        Patient Centered Care    • Patient preferences are identified and integrated in the patient's plan of care Progressing        RESPIRATORY - ADULT    • Achieves

## 2018-04-13 NOTE — BH PROGRESS NOTE
Behavioral Health Note  CHIEF COMPLAINT  Depression and anxiety  REASON FOR ADMISSION  Left pleural effusion, chest pain, left leg cellulitis, and brief episode of gastrointestinal bleed     SOCIAL HISTORY  The patient is currently living in his late mothe yesterday  Speech: very fast, loud volume at times   Mood: anxious  Affect: congruent, labile  Conscious/Orientation: alert and oriented x3, poor attention, no memory impairments  Thought process: disorganized, distracted, unable to focus , fast flow, not

## 2018-04-13 NOTE — PROGRESS NOTES
[facility]    Progress Note    Curt Welsh.  Patient Status:  Inpatient    1960 MRN [id number]   Location [facility] 3W/SW Attending Bassam Mann MD;Nga*   Hosp Day # 4 PCP SIMONE JAMES     Subjective:     Resp left basilar airspace disease. Dictated by (CST): Nasir Boateng MD on 4/12/2018 at 12:59     Approved by (CST): Nasir Boateng MD on 4/12/2018 at 13:02                        Reva Maloney.  Frank Jimenez MD  4/13/2018

## 2018-04-13 NOTE — PLAN OF CARE
Problem: Depression  Goal: Patient Goal  Goal Description:Control of depression    Objectives: To be able to manage depression    Interventions:     Continue medications as ordered. Evaluate effectiveness of treatment.   Assess /evaluate support measures/g

## 2018-04-14 VITALS
RESPIRATION RATE: 20 BRPM | SYSTOLIC BLOOD PRESSURE: 131 MMHG | HEIGHT: 72 IN | TEMPERATURE: 98 F | DIASTOLIC BLOOD PRESSURE: 85 MMHG | HEART RATE: 72 BPM | WEIGHT: 302.38 LBS | BODY MASS INDEX: 40.96 KG/M2 | OXYGEN SATURATION: 92 %

## 2018-04-14 PROCEDURE — 99232 SBSQ HOSP IP/OBS MODERATE 35: CPT | Performed by: INTERNAL MEDICINE

## 2018-04-14 PROCEDURE — 99239 HOSP IP/OBS DSCHRG MGMT >30: CPT | Performed by: HOSPITALIST

## 2018-04-14 RX ORDER — MAGNESIUM OXIDE 400 MG (241.3 MG MAGNESIUM) TABLET
400 TABLET ONCE
Status: COMPLETED | OUTPATIENT
Start: 2018-04-14 | End: 2018-04-14

## 2018-04-14 RX ORDER — CLOPIDOGREL BISULFATE 75 MG/1
75 TABLET ORAL DAILY
Qty: 30 TABLET | Refills: 0 | Status: SHIPPED | OUTPATIENT
Start: 2018-04-15

## 2018-04-14 RX ORDER — ESCITALOPRAM OXALATE 5 MG/1
5 TABLET ORAL NIGHTLY
Qty: 30 TABLET | Refills: 0 | Status: SHIPPED | OUTPATIENT
Start: 2018-04-14

## 2018-04-14 RX ORDER — CEFDINIR 300 MG/1
300 CAPSULE ORAL 2 TIMES DAILY
Qty: 20 CAPSULE | Refills: 0 | Status: SHIPPED | OUTPATIENT
Start: 2018-04-14 | End: 2018-04-24

## 2018-04-14 RX ORDER — ALPRAZOLAM 2 MG/1
1 TABLET ORAL 3 TIMES DAILY
Qty: 15 TABLET | Refills: 0 | Status: SHIPPED | OUTPATIENT
Start: 2018-04-14

## 2018-04-14 RX ORDER — AMIODARONE HYDROCHLORIDE 200 MG/1
200 TABLET ORAL DAILY
Qty: 14 TABLET | Refills: 0 | Status: SHIPPED | OUTPATIENT
Start: 2018-04-14

## 2018-04-14 RX ORDER — ARIPIPRAZOLE 2 MG/1
2 TABLET ORAL NIGHTLY
Qty: 30 TABLET | Refills: 0 | Status: SHIPPED | OUTPATIENT
Start: 2018-04-14

## 2018-04-14 RX ORDER — SODIUM CHLORIDE 9 MG/ML
INJECTION, SOLUTION INTRAVENOUS
Status: DISCONTINUED
Start: 2018-04-14 | End: 2018-04-14

## 2018-04-14 RX ORDER — DOXYCYCLINE 100 MG/1
100 CAPSULE ORAL 2 TIMES DAILY
Qty: 20 CAPSULE | Refills: 0 | Status: SHIPPED | OUTPATIENT
Start: 2018-04-14 | End: 2018-04-24

## 2018-04-14 RX ORDER — 0.9 % SODIUM CHLORIDE 0.9 %
VIAL (ML) INJECTION
Status: COMPLETED
Start: 2018-04-14 | End: 2018-04-14

## 2018-04-14 RX ORDER — FUROSEMIDE 40 MG/1
40 TABLET ORAL 2 TIMES DAILY
Qty: 30 TABLET | Refills: 0 | Status: SHIPPED | OUTPATIENT
Start: 2018-04-14

## 2018-04-14 NOTE — PROGRESS NOTES
[facility]    Progress Note    Gary Primes Sarabjitle Card.  Patient Status:  Inpatient    1960 MRN [id number]   Location [facility] 3W/SW Attending Stephany Saenz MD;Nga*   Hosp Day # 5 PCP SIMONE JAMES         Assessment and Kavita Neck:supple,no JVD  Pulm: Lungs clear, normal respiratory effort  CV: Heart with regular rate and rhythm, Nl S1,S2 ,no S3 or murmur  Abd: Abdomen soft, nontender, nondistended, bowel sounds present  Ext:  no clubbing, no cyanosis,1-2+edema on left with s Approved by (CST): Margarita Webb MD on 4/12/2018 at 13:02                  Tg No MD  4/13/2018

## 2018-04-14 NOTE — PROGRESS NOTES
Patient evaluated today for over 35 minute for follow-up. Over 50% counseling and managing treatment plan. The patient today continue reporting \"difficulty sleeping, irritable and in pain\".   Patient continue exhibiting underlying anxiety and irritabi

## 2018-04-14 NOTE — PLAN OF CARE
Ok to discharge patient home. Discharge instructions explained to patient. Tele and IV discontinued. Prescription given. Patient sent home with family.

## 2018-04-14 NOTE — PROGRESS NOTES
[facility]    Progress Note      Assessment and Plan:   1. Status post evacuation of hemorrhagic large volume pleural effusion postoperative–the patient is doing well clinically.   He still has some atelectasis at the left lung base with [facility]

## 2018-04-14 NOTE — PLAN OF CARE
Anxiety, Panic, OCD    • Long Term Goal Progressing    • Patient Stated Goal Progressing    • Patient Goal Progressing        GASTROINTESTINAL - ADULT    • Maintains or returns to baseline bowel function Progressing        Patient Centered Care    • Patien

## 2018-04-14 NOTE — DISCHARGE SUMMARY
[facility]    Discharge Summary    Linden Velasquez.  Patient Status:  Inpatient    1960 MRN [id number]   Location [facility] 3W/SW Attending No att. providers found   Hosp Day # 5 PCP [address]     Date of Admission: surgery done 2 weeks ago at [facility].  Today, he came into the emergency department after having 1 episode of bright red blood per rectum plus erythema in his left lower extremity from the harvested vein site after failure of outpatient Keflex.   Iván Shetty xanax  -emotionally seems labile     Acute lower GI bleed- from hemorrhoids  - had a BM with a little bit of BRB  - GI consulted  - holding eliquis for now(does not want to resume- would rather be back on plavix) d/w cardiology    - ok with asa and plavix MG Tabs  Commonly known as:  PACERONE  What changed:  when to take this      Take 1 tablet (200 mg total) by mouth daily.    Quantity:  14 tablet  Refills:  0        CONTINUE taking these medications      Instructions Prescription details   aspirin 81 MG Ch Tabs  · cefdinir 300 MG Caps  · Clopidogrel Bisulfate 75 MG Tabs  · Doxycycline Monohydrate 100 MG Caps  · escitalopram 5 MG Tabs  · furosemide 40 MG Tabs         Follow up Visits:  Follow-up with pcp in 1 week    Follow up Labs: n/a     Other Discharge Ins

## 2018-04-14 NOTE — CM/SW NOTE
4/14CM-The Patient's RN informed this Writer was informed that the Patient is medically stable for transfer today (4/14). This Writer informed Latha Victor at  [facility] that the Patient is being discharged today (4/14).   This Writer informed the Patient's RN

## 2018-04-14 NOTE — PROGRESS NOTES
[facility] ID PROGRESS NOTE    Jerald Brurell [place].  Patient Status:  Inpatient    1960 MRN [id number]   Location [facility] ENDOSCOPY LAB SUITES Attending Carmina Gregg MD;Nga*   Hosp Day # 5 PCP SIMONE JAMES     Subjective:  Awake, eating br weeks ago at [facility], who presented to the [address] [place] [address] ED from home on 4/9 with LLE erythema and worsening SOB. History is limited due to patient being sedated by anxiety medications when seen so most of the history was obtained from the chart.  Patient w reviewed. -  Encouraged to keep LLE elevated. Continue to apply tubigrips for increased compression to help with swelling.  -  Reviewed labs, micro, imaging reports.  -  Case d/w patient, RN.     [facility]  (

## 2018-08-27 ENCOUNTER — HOSPITAL ENCOUNTER (EMERGENCY)
Facility: HOSPITAL | Age: 58
Discharge: HOME | End: 2018-08-27
Attending: EMERGENCY MEDICINE
Payer: MEDICARE

## 2018-08-27 ENCOUNTER — APPOINTMENT (OUTPATIENT)
Dept: GENERAL RADIOLOGY | Facility: HOSPITAL | Age: 58
End: 2018-08-27
Attending: EMERGENCY MEDICINE
Payer: MEDICARE

## 2018-08-27 ENCOUNTER — APPOINTMENT (OUTPATIENT)
Dept: ULTRASOUND IMAGING | Facility: HOSPITAL | Age: 58
End: 2018-08-27
Attending: EMERGENCY MEDICINE
Payer: MEDICARE

## 2018-08-27 ENCOUNTER — APPOINTMENT (OUTPATIENT)
Dept: CT IMAGING | Facility: HOSPITAL | Age: 58
End: 2018-08-27
Attending: EMERGENCY MEDICINE
Payer: MEDICARE

## 2018-08-27 VITALS
HEIGHT: 72 IN | WEIGHT: 272 LBS | TEMPERATURE: 98 F | HEART RATE: 95 BPM | SYSTOLIC BLOOD PRESSURE: 141 MMHG | OXYGEN SATURATION: 95 % | BODY MASS INDEX: 36.84 KG/M2 | DIASTOLIC BLOOD PRESSURE: 113 MMHG | RESPIRATION RATE: 18 BRPM

## 2018-08-27 DIAGNOSIS — I48.91 ATRIAL FIBRILLATION WITH RAPID VENTRICULAR RESPONSE (HCC): Primary | ICD-10-CM

## 2018-08-27 DIAGNOSIS — T07.XXXA MULTIPLE CONTUSIONS: ICD-10-CM

## 2018-08-27 DIAGNOSIS — I10 UNCONTROLLED HYPERTENSION: ICD-10-CM

## 2018-08-27 PROCEDURE — 85025 COMPLETE CBC W/AUTO DIFF WBC: CPT | Performed by: EMERGENCY MEDICINE

## 2018-08-27 PROCEDURE — 93970 EXTREMITY STUDY: CPT | Performed by: EMERGENCY MEDICINE

## 2018-08-27 PROCEDURE — 85610 PROTHROMBIN TIME: CPT | Performed by: EMERGENCY MEDICINE

## 2018-08-27 PROCEDURE — 99285 EMERGENCY DEPT VISIT HI MDM: CPT

## 2018-08-27 PROCEDURE — 74177 CT ABD & PELVIS W/CONTRAST: CPT | Performed by: EMERGENCY MEDICINE

## 2018-08-27 PROCEDURE — 70450 CT HEAD/BRAIN W/O DYE: CPT | Performed by: EMERGENCY MEDICINE

## 2018-08-27 PROCEDURE — 73080 X-RAY EXAM OF ELBOW: CPT | Performed by: EMERGENCY MEDICINE

## 2018-08-27 PROCEDURE — 80048 BASIC METABOLIC PNL TOTAL CA: CPT | Performed by: EMERGENCY MEDICINE

## 2018-08-27 PROCEDURE — 71260 CT THORAX DX C+: CPT | Performed by: EMERGENCY MEDICINE

## 2018-08-27 PROCEDURE — 73562 X-RAY EXAM OF KNEE 3: CPT | Performed by: EMERGENCY MEDICINE

## 2018-08-27 PROCEDURE — 83690 ASSAY OF LIPASE: CPT | Performed by: EMERGENCY MEDICINE

## 2018-08-27 PROCEDURE — 96376 TX/PRO/DX INJ SAME DRUG ADON: CPT

## 2018-08-27 PROCEDURE — 73030 X-RAY EXAM OF SHOULDER: CPT | Performed by: EMERGENCY MEDICINE

## 2018-08-27 PROCEDURE — 72125 CT NECK SPINE W/O DYE: CPT | Performed by: EMERGENCY MEDICINE

## 2018-08-27 PROCEDURE — 93005 ELECTROCARDIOGRAM TRACING: CPT

## 2018-08-27 PROCEDURE — 82962 GLUCOSE BLOOD TEST: CPT

## 2018-08-27 PROCEDURE — 96374 THER/PROPH/DIAG INJ IV PUSH: CPT

## 2018-08-27 PROCEDURE — 80076 HEPATIC FUNCTION PANEL: CPT | Performed by: EMERGENCY MEDICINE

## 2018-08-27 PROCEDURE — 80320 DRUG SCREEN QUANTALCOHOLS: CPT | Performed by: EMERGENCY MEDICINE

## 2018-08-27 PROCEDURE — 84484 ASSAY OF TROPONIN QUANT: CPT | Performed by: EMERGENCY MEDICINE

## 2018-08-27 PROCEDURE — 96375 TX/PRO/DX INJ NEW DRUG ADDON: CPT

## 2018-08-27 PROCEDURE — 85730 THROMBOPLASTIN TIME PARTIAL: CPT | Performed by: EMERGENCY MEDICINE

## 2018-08-27 PROCEDURE — 71101 X-RAY EXAM UNILAT RIBS/CHEST: CPT | Performed by: EMERGENCY MEDICINE

## 2018-08-27 PROCEDURE — 93010 ELECTROCARDIOGRAM REPORT: CPT | Performed by: INTERNAL MEDICINE

## 2018-08-27 RX ORDER — MORPHINE SULFATE 4 MG/ML
4 INJECTION, SOLUTION INTRAMUSCULAR; INTRAVENOUS ONCE
Status: COMPLETED | OUTPATIENT
Start: 2018-08-27 | End: 2018-08-27

## 2018-08-27 RX ORDER — OXYCODONE HYDROCHLORIDE 5 MG/1
10 TABLET ORAL EVERY 4 HOURS PRN
Status: CANCELLED | OUTPATIENT
Start: 2018-08-27

## 2018-08-27 RX ORDER — MORPHINE SULFATE 2 MG/ML
4 INJECTION, SOLUTION INTRAMUSCULAR; INTRAVENOUS EVERY 4 HOURS PRN
Status: CANCELLED | OUTPATIENT
Start: 2018-08-27

## 2018-08-27 RX ORDER — MORPHINE SULFATE 4 MG/ML
INJECTION, SOLUTION INTRAMUSCULAR; INTRAVENOUS
Status: COMPLETED
Start: 2018-08-27 | End: 2018-08-27

## 2018-08-27 RX ORDER — ONDANSETRON 2 MG/ML
4 INJECTION INTRAMUSCULAR; INTRAVENOUS ONCE
Status: COMPLETED | OUTPATIENT
Start: 2018-08-27 | End: 2018-08-27

## 2018-08-27 RX ORDER — HYDROCODONE BITARTRATE AND ACETAMINOPHEN 10; 325 MG/1; MG/1
2 TABLET ORAL EVERY 6 HOURS PRN
Status: CANCELLED | OUTPATIENT
Start: 2018-08-27

## 2018-08-27 NOTE — ED NOTES
Wound asepsis and irrigation done to abrasions by ED tech. Pt tolerated well. Dressings placed to elbow and knee area.

## 2018-08-27 NOTE — PROGRESS NOTES
Patient seen and examined independently    >50 min spent at bedside with patient discussing HPI and PMhx.       Mr Junaid Cole is a 64 yo with Anxiety on xanax 1 mg TID prn, CAD s/p 5 stents in 2005 and CABG in 3/2018 at [facility] with post op course complicated a week     Reports multiple family members with heart disease     Exam  BP (!) 157/121   Pulse 113   Temp 97.7 °F (36.5 °C) (Oral)   Resp 20   Ht 182.9 cm (6')   Wt 272 lb (123.4 kg)   SpO2 95%   BMI 36.89 kg/m²     GEN: anxious, very talkative, tangential bedside, pt without palpapitations  -has known afib, pt aware  -last admission d/c'd onplavix only at pt request, does not want other blood thinner  -aware rate is high, aware we were going to admit and have cards see to titrate meds    Mood sx  -denies un

## 2018-08-27 NOTE — ED NOTES
Pt return from radiology, still unable to provide a urine sample. sts he void while in radiology. MD will be notified.

## 2018-08-27 NOTE — ED INITIAL ASSESSMENT (HPI)
Pt was out for a walk and tripped on an uneven side walk, pt landed on his left elbow, chest and knees. Pt denies head injury, or LOC. Pt states he felt dizzy after the fall and SOB.

## 2018-08-27 NOTE — ED PROVIDER NOTES
Patient Seen in: [facility] AND [facility] Emergency Department    History   Patient presents with:  Fall (musculoskeletal, neurologic)    Stated Complaint: fall    HPI    The patient is a 60-year-old male with a past history of coronary artery disease, status p complaint: fall  Other systems are as noted in HPI. Constitutional and vital signs reviewed. All other systems reviewed and negative except as noted above.     Physical Exam   ED Triage Vitals [08/27/18 0842]  BP: (!) 173/119  Pulse: 116  Resp: 22  Te All other components within normal limits   POCT GLUCOSE - Abnormal; Notable for the following:     POC Glucose  111 (*)     All other components within normal limits   CBC W/ DIFFERENTIAL - Abnormal; Notable for the following:     RDW 15.5 (*)     All with all of his imaging studies and lab work from today.     MDM               Disposition and Plan     Clinical Impression:  Atrial fibrillation with rapid ventricular response (HCC)  (primary encounter diagnosis)  Uncontrolled hypertension  Multiple contu

## 2018-08-27 NOTE — ED NOTES
Pt is agreeable for pain medication at this time, pt has decided to be discharged home at this time. Pt is attempting to find transportation home.

## 2018-08-27 NOTE — ED NOTES
Pt given ice packs to left elbow and right knee, pt advised to alternate ice packs to all areas of discomfort. Pt now expressing some relief from pain medication given. Awaiting radiology. Will continue to monitor.

## 2018-08-27 NOTE — ED NOTES
Arm sling placed for comfort, left arm, pt advised to follow up with pmd tomorrow as already scheduled. Pt given copy of CD with films for f/u appt. Pt told to have bp rechecked and to take bp medication as already prescribed.  Told to return with any new

## 2018-08-27 NOTE — ED NOTES
Pt has questions and concerns regarding pending admission, at this time request to speak with  and wish to hold off on pain and bp medication. Will continue to monitor.

## 2019-09-23 ENCOUNTER — HOSPITAL ENCOUNTER (EMERGENCY)
Facility: HOSPITAL | Age: 59
Discharge: HOME | End: 2019-09-23
Attending: EMERGENCY MEDICINE
Payer: MEDICARE

## 2019-09-23 ENCOUNTER — APPOINTMENT (OUTPATIENT)
Dept: GENERAL RADIOLOGY | Facility: HOSPITAL | Age: 59
End: 2019-09-23
Attending: EMERGENCY MEDICINE
Payer: MEDICARE

## 2019-09-23 ENCOUNTER — APPOINTMENT (OUTPATIENT)
Dept: CT IMAGING | Facility: HOSPITAL | Age: 59
End: 2019-09-23
Attending: EMERGENCY MEDICINE
Payer: MEDICARE

## 2019-09-23 VITALS
SYSTOLIC BLOOD PRESSURE: 122 MMHG | DIASTOLIC BLOOD PRESSURE: 87 MMHG | TEMPERATURE: 98 F | BODY MASS INDEX: 36.57 KG/M2 | WEIGHT: 270 LBS | OXYGEN SATURATION: 96 % | RESPIRATION RATE: 20 BRPM | HEIGHT: 72 IN | HEART RATE: 71 BPM

## 2019-09-23 DIAGNOSIS — M54.40 BACK PAIN OF LUMBAR REGION WITH SCIATICA: Primary | ICD-10-CM

## 2019-09-23 LAB
ANION GAP SERPL CALC-SCNC: 7 MMOL/L (ref 0–18)
BASOPHILS # BLD AUTO: 0.07 X10(3) UL (ref 0–0.2)
BASOPHILS NFR BLD AUTO: 0.9 %
BILIRUB UR QL: NEGATIVE
BUN BLD-MCNC: 20 MG/DL (ref 7–18)
BUN/CREAT SERPL: 21.5 (ref 10–20)
CALCIUM BLD-MCNC: 9.2 MG/DL (ref 8.5–10.1)
CHLORIDE SERPL-SCNC: 102 MMOL/L (ref 98–112)
CLARITY UR: CLEAR
CO2 SERPL-SCNC: 27 MMOL/L (ref 21–32)
COLOR UR: YELLOW
CREAT BLD-MCNC: 0.93 MG/DL (ref 0.7–1.3)
DEPRECATED RDW RBC AUTO: 40 FL (ref 35.1–46.3)
EOSINOPHIL # BLD AUTO: 0.1 X10(3) UL (ref 0–0.7)
EOSINOPHIL NFR BLD AUTO: 1.2 %
ERYTHROCYTE [DISTWIDTH] IN BLOOD BY AUTOMATED COUNT: 11.9 % (ref 11–15)
GLUCOSE BLD-MCNC: 206 MG/DL (ref 70–99)
GLUCOSE UR-MCNC: NEGATIVE MG/DL
HCT VFR BLD AUTO: 43.4 % (ref 39–53)
HGB BLD-MCNC: 15.3 G/DL (ref 13–17.5)
HGB UR QL STRIP.AUTO: NEGATIVE
IMM GRANULOCYTES # BLD AUTO: 0.03 X10(3) UL (ref 0–1)
IMM GRANULOCYTES NFR BLD: 0.4 %
KETONES UR-MCNC: NEGATIVE MG/DL
LEUKOCYTE ESTERASE UR QL STRIP.AUTO: NEGATIVE
LYMPHOCYTES # BLD AUTO: 1.65 X10(3) UL (ref 1–4)
LYMPHOCYTES NFR BLD AUTO: 20.3 %
MCH RBC QN AUTO: 32.6 PG (ref 26–34)
MCHC RBC AUTO-ENTMCNC: 35.3 G/DL (ref 31–37)
MCV RBC AUTO: 92.3 FL (ref 80–100)
MONOCYTES # BLD AUTO: 0.71 X10(3) UL (ref 0.1–1)
MONOCYTES NFR BLD AUTO: 8.7 %
NEUTROPHILS # BLD AUTO: 5.57 X10 (3) UL (ref 1.5–7.7)
NEUTROPHILS # BLD AUTO: 5.57 X10(3) UL (ref 1.5–7.7)
NEUTROPHILS NFR BLD AUTO: 68.5 %
NITRITE UR QL STRIP.AUTO: NEGATIVE
OSMOLALITY SERPL CALC.SUM OF ELEC: 291 MOSM/KG (ref 275–295)
PH UR: 5 [PH] (ref 5–8)
PLATELET # BLD AUTO: 257 10(3)UL (ref 150–450)
POTASSIUM SERPL-SCNC: 4.2 MMOL/L (ref 3.5–5.1)
PROT UR-MCNC: NEGATIVE MG/DL
RBC # BLD AUTO: 4.7 X10(6)UL (ref 4.3–5.7)
SODIUM SERPL-SCNC: 136 MMOL/L (ref 136–145)
SP GR UR STRIP: 1.01 (ref 1–1.03)
TROPONIN I SERPL-MCNC: <0.045 NG/ML (ref ?–0.04)
UROBILINOGEN UR STRIP-ACNC: <2
WBC # BLD AUTO: 8.1 X10(3) UL (ref 4–11)

## 2019-09-23 PROCEDURE — 84484 ASSAY OF TROPONIN QUANT: CPT | Performed by: EMERGENCY MEDICINE

## 2019-09-23 PROCEDURE — 93005 ELECTROCARDIOGRAM TRACING: CPT

## 2019-09-23 PROCEDURE — 93010 ELECTROCARDIOGRAM REPORT: CPT | Performed by: EMERGENCY MEDICINE

## 2019-09-23 PROCEDURE — 80048 BASIC METABOLIC PNL TOTAL CA: CPT | Performed by: EMERGENCY MEDICINE

## 2019-09-23 PROCEDURE — 96374 THER/PROPH/DIAG INJ IV PUSH: CPT

## 2019-09-23 PROCEDURE — 74177 CT ABD & PELVIS W/CONTRAST: CPT | Performed by: EMERGENCY MEDICINE

## 2019-09-23 PROCEDURE — 71045 X-RAY EXAM CHEST 1 VIEW: CPT | Performed by: EMERGENCY MEDICINE

## 2019-09-23 PROCEDURE — 99285 EMERGENCY DEPT VISIT HI MDM: CPT

## 2019-09-23 PROCEDURE — 85025 COMPLETE CBC W/AUTO DIFF WBC: CPT | Performed by: EMERGENCY MEDICINE

## 2019-09-23 PROCEDURE — 81003 URINALYSIS AUTO W/O SCOPE: CPT | Performed by: EMERGENCY MEDICINE

## 2019-09-23 RX ORDER — TIZANIDINE HYDROCHLORIDE 4 MG/1
4 CAPSULE, GELATIN COATED ORAL 3 TIMES DAILY PRN
Qty: 20 CAPSULE | Refills: 0 | Status: SHIPPED | OUTPATIENT
Start: 2019-09-23

## 2019-09-23 RX ORDER — MORPHINE SULFATE 4 MG/ML
6 INJECTION, SOLUTION INTRAMUSCULAR; INTRAVENOUS ONCE
Status: COMPLETED | OUTPATIENT
Start: 2019-09-23 | End: 2019-09-23

## 2019-09-23 NOTE — ED INITIAL ASSESSMENT (HPI)
Pt present with right leg buttock pain radiating down the left leg - hx of sciatica. Pt also c/o chest pain and SOB - extensive cardiac Hx.  States his pain meds stopped working

## 2019-09-23 NOTE — ED PROVIDER NOTES
Patient Seen in: [facility] Emergency Department      History   Patient presents with:  Chest Pain Angina (cardiovascular)    Stated Complaint: chest pain    HPI    56-year-old male with history of CAD status post CABG, chronic lower back pain who (36.4 °C) (Temporal)   Resp 20   Ht 182.9 cm (6')   Wt 122.5 kg   SpO2 96%   BMI 36.62 kg/m²          Physical Exam   Constitutional: He appears well-developed and well-nourished. HENT:   Head: Normocephalic and atraumatic.    Eyes: EOM are normal.   Neck V3              Xr Chest Ap Portable  (cpt=71045)    Result Date: 9/23/2019  CONCLUSION: Mild interstitial edema, unchanged since the prior exams.      Dictated by (CST): Patricio Pollack MD on 9/23/2019 at 10:52     Approved by (CST): Patricio Pollack MD on 9/23/2 (three) times daily as needed for Muscle spasms. , Normal, Disp-20 capsule, R-0                            EpicACT:ED_HEARTSCORE_SF_POPUP,RunParamsURLEncoded:701%7C

## 2019-09-23 NOTE — CM/SW NOTE
Cm spoke with [facility] pain management navigator who was able to make appointment with the [address] for Thursday Sept 26 at 10:30 am in the [facility]    Patient informed to get medical records from previous p

## 2019-09-26 ENCOUNTER — OFFICE VISIT (OUTPATIENT)
Dept: PAIN CLINIC | Facility: HOSPITAL | Age: 59
End: 2019-09-26
Attending: ANESTHESIOLOGY
Payer: MEDICARE

## 2019-09-26 DIAGNOSIS — M54.40 ACUTE BILATERAL LOW BACK PAIN WITH SCIATICA, SCIATICA LATERALITY UNSPECIFIED: Primary | ICD-10-CM

## 2019-09-26 PROCEDURE — 99203 OFFICE O/P NEW LOW 30 MIN: CPT

## 2019-09-26 NOTE — PROGRESS NOTES
Pt presents to [facility] ambulatory for low back pain referred by [facility]. Pt was seeing another pain clinic and him and the doctor \"agreed to part ways. \" Pt c/o of low back pain and right leg pain.   Pt is currently taking Norco 10/325mg 4 pills per day gi

## 2019-09-26 NOTE — CHRONIC PAIN
Initial Consultation Note      HISTORY OF PRESENT ILLNESS:  Suri Melena. is a 63year old old male referred to the pain clinic  for evaluation treatment of his chronic low back pain C is a 36-year-old white male with primary care doctor primarily [facility] Rfl: 0   ARIPiprazole 2 MG Oral Tab Take 1 tablet (2 mg total) by mouth nightly. Disp: 30 tablet Rfl: 0   Clopidogrel Bisulfate 75 MG Oral Tab Take 1 tablet (75 mg total) by mouth daily.  Disp: 30 tablet Rfl: 0   furosemide 40 MG Oral Tab Take 1 tablet (40 Episodic mood disorder (HCC)     Atrial fibrillation with rapid ventricular response [facility])    Past Medical History:   Diagnosis Date   • Anxiety    • Atherosclerosis of coronary artery    • Atrial fibrillation [facility])    • Back pain    • [facility]    • EXAMINATION:  There were no vitals filed for this visit.   General: Alert and oriented x3  Affect:  NAD  Head: normocephalic, atraumatic  Eyes: anicteric; no injection  Chest: S1, S2, RRR  Respiratory: CTAB  Gait: Broad-based; cane user - No  Spine: Normal atherosclerotic calcification of the aortic arch and thoracic aorta. There are median sternotomy wires and postoperative changes of CABG. LUNGS/PLEURA: Mild perihilar fullness with bilateral reticulonodular opacities suggestive of mild interstitial edema. LIVER: Enlarged, measuring 22.7 cm in craniocaudal axis, and diffusely hypoattenuating with areas of relative hyperdensity adjacent to the gallbladder fossa, consistent with hepatic steatosis and areas of fatty sparing. BILIARY: The gallbladder is present. OTHER: No free air or fluid is seen in the abdomen or pelvis. CONCLUSION:  1. No acute intra-abdominal process is identified. The etiology of the patient's symptoms is unclear from this study.   2. Nonspecific distal esophageal wall thickening; co steroids, analgesics), injections, and further testing. Risks and benefits of all options were discussed at length to patients satisfaction during a comprehensive interactive discussion.  All questions were answered during extended questions and answer sess

## 2019-09-28 ENCOUNTER — HOSPITAL ENCOUNTER (OUTPATIENT)
Dept: CT IMAGING | Facility: HOSPITAL | Age: 59
Discharge: HOME | End: 2019-09-28
Attending: ANESTHESIOLOGY
Payer: MEDICARE

## 2019-09-28 DIAGNOSIS — M54.40 ACUTE BILATERAL LOW BACK PAIN WITH SCIATICA, SCIATICA LATERALITY UNSPECIFIED: ICD-10-CM

## 2019-09-28 PROCEDURE — 72131 CT LUMBAR SPINE W/O DYE: CPT | Performed by: ANESTHESIOLOGY

## 2019-10-02 ENCOUNTER — DOCUMENTATION ONLY (OUTPATIENT)
Dept: PAIN CLINIC | Facility: HOSPITAL | Age: 59
End: 2019-10-02

## 2019-10-02 NOTE — PROGRESS NOTES
Procedure code 99604---ioyfgpabs for 10/7/19      No PA needed per pt's insurance     Order form sent to the surgery center, confirmation rvc'd

## 2023-04-23 NOTE — ANESTHESIA POSTPROCEDURE EVALUATION
Patient: Marjorie Velasquez.     Procedure Summary     Date:  04/11/18 Room / Location:  [address] [place] [address] ENDOSCOPY 01 / [address] [place] [address] ENDOSCOPY    Anesthesia Start:  4630 Anesthesia Stop:  7730    Procedure:  COLONOSCOPY (N/A ) Diagnosis:       Rectal bleeding      (sigmoid colo no

## (undated) DEVICE — SNARE CAPTIFLEX MICRO-OVL OLY

## (undated) DEVICE — FORCEP RADIAL JAW 4

## (undated) DEVICE — Device: Brand: DEFENDO AIR/WATER/SUCTION AND BIOPSY VALVE

## (undated) DEVICE — TRAP 4 CPTR CHMBR N EZ INLN

## (undated) DEVICE — ENDOSCOPY PACK - LOWER: Brand: MEDLINE INDUSTRIES, INC.

## (undated) NOTE — ED AVS SNAPSHOT
Chaparrita Bernal. MRN: [id number]    Department:  [facility] Emergency Department   Date of Visit:  9/23/2019           Disclosure     Insurance plans vary and the physician(s) referred by the ER may not be covered by your plan.  Please cont within the next three months to obtain basic health screening including reassessment of your blood pressure.     IF THERE IS ANY CHANGE OR WORSENING OF YOUR CONDITION, CALL YOUR PRIMARY CARE PHYSICIAN AT ONCE OR RETURN IMMEDIATELY TO THE EMERGENCY DEPARTMEN

## (undated) NOTE — LETTER
[address], [place]  Authorization for Invasive Procedures  1.  I hereby authorize Dr. Carly Florian , my physician and whomever may be designated as the doctor's assistant, to perform the following operation and/or procedur performed for the purposes of advancing medicine, science, and/or education, provided my identity is not revealed. If the procedure has been videotaped, the physician/surgeon will obtain the original videotape.  The hospital will not be responsible for stor My signature below affirms that prior to the time of the procedure, I have explained to the patient and/or his legal representative, the risks and benefits involved in the proposed treatment and any reasonable alternative to the proposed treatment.  I have

## (undated) NOTE — LETTER
[facility], [place]  Authorization for Invasive Procedures  1.  I hereby authorize Dr. Kalie Amaya , my physician and whomever may be designated as the doctor's assistant, to perform the following operation and/or procedure:  Lef performed for the purposes of advancing medicine, science, and/or education, provided my identity is not revealed. If the procedure has been videotaped, the physician/surgeon will obtain the original videotape.  The hospital will not be responsible for stor My signature below affirms that prior to the time of the procedure, I have explained to the patient and/or his legal representative, the risks and benefits involved in the proposed treatment and any reasonable alternative to the proposed treatment.  I have

## (undated) NOTE — ED AVS SNAPSHOT
Yulia Bauer. MRN: [id number]    Department:  [facility] Emergency Department   Date of Visit:  8/27/2018           Disclosure     Insurance plans vary and the physician(s) referred by the ER may not be covered by your plan.  Please cont within the next three months to obtain basic health screening including reassessment of your blood pressure.     IF THERE IS ANY CHANGE OR WORSENING OF YOUR CONDITION, CALL YOUR PRIMARY CARE PHYSICIAN AT ONCE OR RETURN IMMEDIATELY TO THE EMERGENCY DEPARTMEN